# Patient Record
Sex: FEMALE | Race: ASIAN | NOT HISPANIC OR LATINO | URBAN - METROPOLITAN AREA
[De-identification: names, ages, dates, MRNs, and addresses within clinical notes are randomized per-mention and may not be internally consistent; named-entity substitution may affect disease eponyms.]

---

## 2023-06-08 ENCOUNTER — OUTPATIENT (OUTPATIENT)
Dept: OUTPATIENT SERVICES | Facility: HOSPITAL | Age: 68
LOS: 1 days | End: 2023-06-08
Payer: COMMERCIAL

## 2023-06-08 VITALS
TEMPERATURE: 97 F | RESPIRATION RATE: 16 BRPM | HEIGHT: 65 IN | WEIGHT: 154.1 LBS | OXYGEN SATURATION: 98 % | DIASTOLIC BLOOD PRESSURE: 88 MMHG | HEART RATE: 72 BPM | SYSTOLIC BLOOD PRESSURE: 146 MMHG

## 2023-06-08 DIAGNOSIS — E11.9 TYPE 2 DIABETES MELLITUS WITHOUT COMPLICATIONS: Chronic | ICD-10-CM

## 2023-06-08 DIAGNOSIS — Z98.890 OTHER SPECIFIED POSTPROCEDURAL STATES: Chronic | ICD-10-CM

## 2023-06-08 DIAGNOSIS — I10 ESSENTIAL (PRIMARY) HYPERTENSION: ICD-10-CM

## 2023-06-08 DIAGNOSIS — Z01.818 ENCOUNTER FOR OTHER PREPROCEDURAL EXAMINATION: ICD-10-CM

## 2023-06-08 DIAGNOSIS — I10 ESSENTIAL (PRIMARY) HYPERTENSION: Chronic | ICD-10-CM

## 2023-06-08 DIAGNOSIS — M17.11 UNILATERAL PRIMARY OSTEOARTHRITIS, RIGHT KNEE: ICD-10-CM

## 2023-06-08 DIAGNOSIS — E11.9 TYPE 2 DIABETES MELLITUS WITHOUT COMPLICATIONS: ICD-10-CM

## 2023-06-08 LAB
A1C WITH ESTIMATED AVERAGE GLUCOSE RESULT: 6.4 % — HIGH (ref 4–5.6)
BLD GP AB SCN SERPL QL: SIGNIFICANT CHANGE UP
ESTIMATED AVERAGE GLUCOSE: 137 MG/DL — HIGH (ref 68–114)

## 2023-06-08 RX ORDER — POVIDONE-IODINE 5 %
1 AEROSOL (ML) TOPICAL ONCE
Refills: 0 | Status: COMPLETED | OUTPATIENT
Start: 2023-06-09 | End: 2023-06-09

## 2023-06-08 NOTE — H&P PST ADULT - HISTORY OF PRESENT ILLNESS
67 y.o female with PMHx for HLD,  Asthma, DM, HTN, and Osteoporosis, B/l Carpal tunnel surgery [id number], Right Knee Arthroscopy 2020.  c/o of persistent right knee pain, on w/u found to have Ostearthritis of Right knee and now presents for presurgical evaluation for   schedule Right Total Knee Replacement on 6/9/23 with Dr Arce

## 2023-06-08 NOTE — H&P PST ADULT - NSANTHOSAYNRD_GEN_A_CORE
No. MARAL screening performed.  STOP BANG Legend: 0-2 = LOW Risk; 3-4 = INTERMEDIATE Risk; 5-8 = HIGH Risk

## 2023-06-08 NOTE — H&P PST ADULT - ATTENDING COMMENTS
Right knee OA  Plan for right knee TKA  Risks discussed: infection, hardware failure, blood clot, etc...  She signed the consent.

## 2023-06-08 NOTE — H&P PST ADULT - ASSESSMENT
Right Total Knee Replacement on 6/9/23 with Dr Crooks 67 y.o female with  Ostearthritis of Right knee and now for schedule Right Total Knee Replacement on 6/9/23 with Dr Yazmin YARBROUGH 3

## 2023-06-08 NOTE — H&P PST ADULT - NSICDXPASTMEDICALHX_GEN_ALL_CORE_FT
PAST MEDICAL HISTORY:  DM (diabetes mellitus)     HTN (hypertension)      PAST MEDICAL HISTORY:  Asthma     DM (diabetes mellitus)     HLD (hyperlipidemia)     HTN (hypertension)     OA (osteoarthritis)

## 2023-06-08 NOTE — CHART NOTE - NSCHARTNOTEFT_GEN_A_CORE
PRE-TJR SOCIAL/FUNCTIONAL SCREENING Via:     In Person Meet  on 6/8/2023:  Preferred Language: Mandarin ([phone])  Patient Telephone #: [phone]  EMAIL ADDRESS:  [email or web address]  Insurance: [employer]    Pt stated Goal for Surgery : No Pain, walk normally   SURGERY DETAILS:    Sx Date:  6/9/2023                                                                                           Surgery Type:  Right Knee Replacement  Surgeon:  Dr. Arce     SOCIAL HISTORY:   Live With: Alone in a Multifamily Home on the 2nd floor  Stairs Required to Access (Street to Front Door) Residence:  Yes; 7 into the house and 13 to the 2nd floor    Once Inside Pt Residence:   To Access a Bathroom:      No Stairs Required     To Access a Bedroom Where Pt. Can Sleep:  No Stairs Required     Pt. Currently Has Formal Home Health Services:   Yes ; [facility], [place]             Telephone Number: [phone]                                                                                                    HHA: Jeremy  [phone]  MOBILITY HISTORY:   Baseline Ambulation- Pt is Independent in ambulation with Device:    Yes;   Cane   Pt. Owns Any Other Medical Equipment?  No, will need rolling walker and 3-in-1 commode     MEDICAL HISTORY:  Pt has any History of Back Surgery:   No    Pt has any Allergies:  No    Patient denies diagnosis of sleep apnea or use of CPAP    Pt. Pharmacy Information:  Name:  [id number]                  Address:  [address] [place] [address]        Telephone #: [phone]    Pt. will Require Transportation to Home Upon Discharge:  Yes;  will be Made Aware:    For Post-Acute Care:  Pt. prefers [facility] for post-acute needs     Pt was provided with pre- op education in her language using translated power point and translated papes as well as .  Pt was provided with, and educated on, StretchStrap stretching & exercise device/strap. Pt. was provided with exercise program to facilitate range of motion and strength post-knee joint replacement. Pt. verbalized understanding.   All questions were answered, pt. has my phone number for follow up as needed.

## 2023-06-08 NOTE — H&P PST ADULT - PROBLEM SELECTOR PLAN 1
Pt schedule for Right Total Knee Replacement on 6/9/23 with Dr Arce.    Labs drawn in PCP = results in chart   Pt Medical clearance- in chart      Pt was  instructed to stop aspirin/ecotrin and all over the counter medication including vitamins and herbal supplements one week prior to surgery   Instructions given on the use of 4% chlorhexidine wash and Pt verbalized understanding of same   Pt Instructed to have nothing by mouth starting midnight day before surgery  Patient is to expect a phone call day before surgery between the hours of 430- 630pm giving arrival time for surgery   Written and verbal preoperative instructions given to patient with understanding verbalized.     Patient today with STOP bang score 3  Low  risk for MARAL Pt schedule for Right Total Knee Replacement on 6/9/23 with Dr Arce.    Labs and EKG done by PCP = results in chart   Pt Medical clearance- in chart    Pt Instructed on use of Mupirocin if nasal swab positive    Post surgery instruction given by Mack, booklets and pamphlets also given    Pt was  instructed to continue aspirin but stop all other over the counter medication including vitamins and herbal supplements one week prior to surgery   Instructions given on the use of 4% chlorhexidine wash and Pt verbalized understanding of same   Pt Instructed to have nothing by mouth starting midnight day before surgery  Patient is to expect a phone call day before surgery between the hours of 430- 630pm giving arrival time for surgery   Written and verbal preoperative instructions given to patient with understanding verbalized via  #[id number]    Patient today with STOP bang score 3  Low  risk for MARAL

## 2023-06-08 NOTE — H&P PST ADULT - MUSCULOSKELETAL
normal/ROM intact/normal gait/strength 5/5 bilateral upper extremities/strength 5/5 bilateral lower extremities details… uses can for ambulation due to right knee pain/ROM intact/strength 5/5 bilateral upper extremities

## 2023-06-09 ENCOUNTER — INPATIENT (INPATIENT)
Facility: HOSPITAL | Age: 68
LOS: 0 days | Discharge: HOME HEALTH | DRG: 470 | End: 2023-06-09
Attending: ORTHOPAEDIC SURGERY | Admitting: ORTHOPAEDIC SURGERY
Payer: COMMERCIAL

## 2023-06-09 VITALS — HEART RATE: 71 BPM | OXYGEN SATURATION: 98 % | DIASTOLIC BLOOD PRESSURE: 77 MMHG | SYSTOLIC BLOOD PRESSURE: 111 MMHG

## 2023-06-09 VITALS
OXYGEN SATURATION: 96 % | WEIGHT: 154.1 LBS | HEIGHT: 65 IN | TEMPERATURE: 98 F | HEART RATE: 70 BPM | DIASTOLIC BLOOD PRESSURE: 75 MMHG | RESPIRATION RATE: 16 BRPM | SYSTOLIC BLOOD PRESSURE: 125 MMHG

## 2023-06-09 DIAGNOSIS — Z98.890 OTHER SPECIFIED POSTPROCEDURAL STATES: Chronic | ICD-10-CM

## 2023-06-09 DIAGNOSIS — G89.18 OTHER ACUTE POSTPROCEDURAL PAIN: ICD-10-CM

## 2023-06-09 DIAGNOSIS — Z96.651 PRESENCE OF RIGHT ARTIFICIAL KNEE JOINT: ICD-10-CM

## 2023-06-09 DIAGNOSIS — M17.11 UNILATERAL PRIMARY OSTEOARTHRITIS, RIGHT KNEE: ICD-10-CM

## 2023-06-09 DIAGNOSIS — Z01.818 ENCOUNTER FOR OTHER PREPROCEDURAL EXAMINATION: ICD-10-CM

## 2023-06-09 LAB
ANION GAP SERPL CALC-SCNC: 5 MMOL/L — SIGNIFICANT CHANGE UP (ref 5–17)
BLD GP AB SCN SERPL QL: SIGNIFICANT CHANGE UP
BUN SERPL-MCNC: 14 MG/DL — SIGNIFICANT CHANGE UP (ref 7–18)
CALCIUM SERPL-MCNC: 8.4 MG/DL — SIGNIFICANT CHANGE UP (ref 8.4–10.5)
CHLORIDE SERPL-SCNC: 109 MMOL/L — HIGH (ref 96–108)
CO2 SERPL-SCNC: 26 MMOL/L — SIGNIFICANT CHANGE UP (ref 22–31)
CREAT SERPL-MCNC: 0.55 MG/DL — SIGNIFICANT CHANGE UP (ref 0.5–1.3)
EGFR: 100 ML/MIN/1.73M2 — SIGNIFICANT CHANGE UP
GLUCOSE BLDC GLUCOMTR-MCNC: 133 MG/DL — HIGH (ref 70–99)
GLUCOSE BLDC GLUCOMTR-MCNC: 150 MG/DL — HIGH (ref 70–99)
GLUCOSE BLDC GLUCOMTR-MCNC: 219 MG/DL — HIGH (ref 70–99)
GLUCOSE SERPL-MCNC: 166 MG/DL — HIGH (ref 70–99)
HCT VFR BLD CALC: 33.2 % — LOW (ref 34.5–45)
HGB BLD-MCNC: 11.1 G/DL — LOW (ref 11.5–15.5)
MCHC RBC-ENTMCNC: 31.9 PG — SIGNIFICANT CHANGE UP (ref 27–34)
MCHC RBC-ENTMCNC: 33.4 GM/DL — SIGNIFICANT CHANGE UP (ref 32–36)
MCV RBC AUTO: 95.4 FL — SIGNIFICANT CHANGE UP (ref 80–100)
MRSA PCR RESULT.: SIGNIFICANT CHANGE UP
NRBC # BLD: 0 /100 WBCS — SIGNIFICANT CHANGE UP (ref 0–0)
PLATELET # BLD AUTO: 201 K/UL — SIGNIFICANT CHANGE UP (ref 150–400)
POTASSIUM SERPL-MCNC: 3.4 MMOL/L — LOW (ref 3.5–5.3)
POTASSIUM SERPL-SCNC: 3.4 MMOL/L — LOW (ref 3.5–5.3)
RBC # BLD: 3.48 M/UL — LOW (ref 3.8–5.2)
RBC # FLD: 12.1 % — SIGNIFICANT CHANGE UP (ref 10.3–14.5)
S AUREUS DNA NOSE QL NAA+PROBE: SIGNIFICANT CHANGE UP
SODIUM SERPL-SCNC: 140 MMOL/L — SIGNIFICANT CHANGE UP (ref 135–145)
WBC # BLD: 7.33 K/UL — SIGNIFICANT CHANGE UP (ref 3.8–10.5)
WBC # FLD AUTO: 7.33 K/UL — SIGNIFICANT CHANGE UP (ref 3.8–10.5)

## 2023-06-09 PROCEDURE — G0463: CPT

## 2023-06-09 PROCEDURE — 36415 COLL VENOUS BLD VENIPUNCTURE: CPT

## 2023-06-09 PROCEDURE — 88311 DECALCIFY TISSUE: CPT | Mod: 26

## 2023-06-09 PROCEDURE — 88311 DECALCIFY TISSUE: CPT

## 2023-06-09 PROCEDURE — 88305 TISSUE EXAM BY PATHOLOGIST: CPT

## 2023-06-09 PROCEDURE — 73560 X-RAY EXAM OF KNEE 1 OR 2: CPT

## 2023-06-09 PROCEDURE — C1713: CPT

## 2023-06-09 PROCEDURE — 73560 X-RAY EXAM OF KNEE 1 OR 2: CPT | Mod: 26,RT

## 2023-06-09 PROCEDURE — 82962 GLUCOSE BLOOD TEST: CPT

## 2023-06-09 PROCEDURE — 80048 BASIC METABOLIC PNL TOTAL CA: CPT

## 2023-06-09 PROCEDURE — C1776: CPT

## 2023-06-09 PROCEDURE — 99222 1ST HOSP IP/OBS MODERATE 55: CPT

## 2023-06-09 PROCEDURE — 88305 TISSUE EXAM BY PATHOLOGIST: CPT | Mod: 26

## 2023-06-09 PROCEDURE — 86850 RBC ANTIBODY SCREEN: CPT

## 2023-06-09 PROCEDURE — 85027 COMPLETE CBC AUTOMATED: CPT

## 2023-06-09 PROCEDURE — 86901 BLOOD TYPING SEROLOGIC RH(D): CPT

## 2023-06-09 PROCEDURE — 86900 BLOOD TYPING SEROLOGIC ABO: CPT

## 2023-06-09 DEVICE — PIN STRL 1/8 X 3.5IN LONG: Type: IMPLANTABLE DEVICE | Site: RIGHT | Status: FUNCTIONAL

## 2023-06-09 DEVICE — CEMENT SIMPLEX P 40GM: Type: IMPLANTABLE DEVICE | Site: RIGHT | Status: FUNCTIONAL

## 2023-06-09 DEVICE — COMP FEM TRIATHLON CR SZ 3 RT: Type: IMPLANTABLE DEVICE | Site: RIGHT | Status: FUNCTIONAL

## 2023-06-09 DEVICE — CEMENT SIMPLEX WITH TOBRAMYCIN: Type: IMPLANTABLE DEVICE | Site: RIGHT | Status: FUNCTIONAL

## 2023-06-09 DEVICE — BASEPLATE TIB UNIV TRIATHLON SZ 3: Type: IMPLANTABLE DEVICE | Site: RIGHT | Status: FUNCTIONAL

## 2023-06-09 DEVICE — INSERT TIB BEARING CS X3 SZ 3 13MM: Type: IMPLANTABLE DEVICE | Site: RIGHT | Status: FUNCTIONAL

## 2023-06-09 DEVICE — IMP PATELLA ASYMMETRIC X3 29X9MM: Type: IMPLANTABLE DEVICE | Site: RIGHT | Status: FUNCTIONAL

## 2023-06-09 RX ORDER — FENTANYL CITRATE 50 UG/ML
50 INJECTION INTRAVENOUS
Refills: 0 | Status: DISCONTINUED | OUTPATIENT
Start: 2023-06-09 | End: 2023-06-09

## 2023-06-09 RX ORDER — METFORMIN HYDROCHLORIDE 850 MG/1
1 TABLET ORAL
Refills: 0 | DISCHARGE

## 2023-06-09 RX ORDER — TRAMADOL HYDROCHLORIDE 50 MG/1
50 TABLET ORAL EVERY 8 HOURS
Refills: 0 | Status: DISCONTINUED | OUTPATIENT
Start: 2023-06-09 | End: 2023-06-09

## 2023-06-09 RX ORDER — INSULIN LISPRO 100/ML
VIAL (ML) SUBCUTANEOUS
Refills: 0 | Status: DISCONTINUED | OUTPATIENT
Start: 2023-06-09 | End: 2023-06-09

## 2023-06-09 RX ORDER — ENOXAPARIN SODIUM 100 MG/ML
30 INJECTION SUBCUTANEOUS EVERY 12 HOURS
Refills: 0 | Status: DISCONTINUED | OUTPATIENT
Start: 2023-06-09 | End: 2023-06-09

## 2023-06-09 RX ORDER — LOSARTAN POTASSIUM 100 MG/1
1 TABLET, FILM COATED ORAL
Refills: 0 | DISCHARGE

## 2023-06-09 RX ORDER — ALENDRONATE SODIUM 70 MG/1
1 TABLET ORAL
Refills: 0 | DISCHARGE

## 2023-06-09 RX ORDER — SODIUM CHLORIDE 9 MG/ML
1000 INJECTION, SOLUTION INTRAVENOUS
Refills: 0 | Status: DISCONTINUED | OUTPATIENT
Start: 2023-06-09 | End: 2023-06-09

## 2023-06-09 RX ORDER — OXYBUTYNIN CHLORIDE 5 MG
5 TABLET ORAL DAILY
Refills: 0 | Status: DISCONTINUED | OUTPATIENT
Start: 2023-06-09 | End: 2023-06-09

## 2023-06-09 RX ORDER — SODIUM CHLORIDE 9 MG/ML
3 INJECTION INTRAMUSCULAR; INTRAVENOUS; SUBCUTANEOUS EVERY 8 HOURS
Refills: 0 | Status: DISCONTINUED | OUTPATIENT
Start: 2023-06-09 | End: 2023-06-09

## 2023-06-09 RX ORDER — DEXTROSE 50 % IN WATER 50 %
25 SYRINGE (ML) INTRAVENOUS ONCE
Refills: 0 | Status: DISCONTINUED | OUTPATIENT
Start: 2023-06-09 | End: 2023-06-09

## 2023-06-09 RX ORDER — CHLORHEXIDINE GLUCONATE 213 G/1000ML
1 SOLUTION TOPICAL ONCE
Refills: 0 | Status: COMPLETED | OUTPATIENT
Start: 2023-06-09 | End: 2023-06-09

## 2023-06-09 RX ORDER — SODIUM CHLORIDE 9 MG/ML
1000 INJECTION INTRAMUSCULAR; INTRAVENOUS; SUBCUTANEOUS
Refills: 0 | Status: DISCONTINUED | OUTPATIENT
Start: 2023-06-09 | End: 2023-06-09

## 2023-06-09 RX ORDER — TRAMADOL HYDROCHLORIDE 50 MG/1
1 TABLET ORAL
Qty: 6 | Refills: 0
Start: 2023-06-09 | End: 2023-06-11

## 2023-06-09 RX ORDER — CEFAZOLIN SODIUM 1 G
2000 VIAL (EA) INJECTION EVERY 8 HOURS
Refills: 0 | Status: COMPLETED | OUTPATIENT
Start: 2023-06-09 | End: 2023-06-10

## 2023-06-09 RX ORDER — CELECOXIB 200 MG/1
200 CAPSULE ORAL ONCE
Refills: 0 | Status: COMPLETED | OUTPATIENT
Start: 2023-06-09 | End: 2023-06-09

## 2023-06-09 RX ORDER — OXYBUTYNIN CHLORIDE 5 MG
1 TABLET ORAL
Refills: 0 | DISCHARGE

## 2023-06-09 RX ORDER — TRAMADOL HYDROCHLORIDE 50 MG/1
50 TABLET ORAL ONCE
Refills: 0 | Status: DISCONTINUED | OUTPATIENT
Start: 2023-06-09 | End: 2023-06-09

## 2023-06-09 RX ORDER — AMLODIPINE BESYLATE 2.5 MG/1
5 TABLET ORAL DAILY
Refills: 0 | Status: DISCONTINUED | OUTPATIENT
Start: 2023-06-09 | End: 2023-06-09

## 2023-06-09 RX ORDER — LOSARTAN POTASSIUM 100 MG/1
100 TABLET, FILM COATED ORAL DAILY
Refills: 0 | Status: DISCONTINUED | OUTPATIENT
Start: 2023-06-09 | End: 2023-06-09

## 2023-06-09 RX ORDER — PANTOPRAZOLE SODIUM 20 MG/1
40 TABLET, DELAYED RELEASE ORAL
Refills: 0 | Status: DISCONTINUED | OUTPATIENT
Start: 2023-06-09 | End: 2023-06-09

## 2023-06-09 RX ORDER — CALCIUM CARBONATE 500(1250)
0 TABLET ORAL
Refills: 0 | DISCHARGE

## 2023-06-09 RX ORDER — OXYCODONE HYDROCHLORIDE 5 MG/1
5 TABLET ORAL EVERY 4 HOURS
Refills: 0 | Status: DISCONTINUED | OUTPATIENT
Start: 2023-06-09 | End: 2023-06-09

## 2023-06-09 RX ORDER — CELECOXIB 200 MG/1
200 CAPSULE ORAL DAILY
Refills: 0 | Status: DISCONTINUED | OUTPATIENT
Start: 2023-06-10 | End: 2023-06-09

## 2023-06-09 RX ORDER — DEXTROSE 50 % IN WATER 50 %
12.5 SYRINGE (ML) INTRAVENOUS ONCE
Refills: 0 | Status: DISCONTINUED | OUTPATIENT
Start: 2023-06-09 | End: 2023-06-09

## 2023-06-09 RX ORDER — POTASSIUM CHLORIDE 20 MEQ
20 PACKET (EA) ORAL ONCE
Refills: 0 | Status: COMPLETED | OUTPATIENT
Start: 2023-06-09 | End: 2023-06-09

## 2023-06-09 RX ORDER — OMEPRAZOLE 10 MG/1
1 CAPSULE, DELAYED RELEASE ORAL
Refills: 0 | DISCHARGE

## 2023-06-09 RX ORDER — ICOSAPENT ETHYL 500 MG/1
2 CAPSULE, LIQUID FILLED ORAL
Refills: 0 | DISCHARGE

## 2023-06-09 RX ORDER — DEXTROSE 50 % IN WATER 50 %
15 SYRINGE (ML) INTRAVENOUS ONCE
Refills: 0 | Status: DISCONTINUED | OUTPATIENT
Start: 2023-06-09 | End: 2023-06-09

## 2023-06-09 RX ORDER — POLYETHYLENE GLYCOL 3350 17 G/17G
17 POWDER, FOR SOLUTION ORAL AT BEDTIME
Refills: 0 | Status: DISCONTINUED | OUTPATIENT
Start: 2023-06-09 | End: 2023-06-09

## 2023-06-09 RX ORDER — AMLODIPINE BESYLATE 2.5 MG/1
1 TABLET ORAL
Refills: 0 | DISCHARGE

## 2023-06-09 RX ORDER — SENNA PLUS 8.6 MG/1
2 TABLET ORAL AT BEDTIME
Refills: 0 | Status: DISCONTINUED | OUTPATIENT
Start: 2023-06-09 | End: 2023-06-09

## 2023-06-09 RX ORDER — MAGNESIUM HYDROXIDE 400 MG/1
30 TABLET, CHEWABLE ORAL DAILY
Refills: 0 | Status: DISCONTINUED | OUTPATIENT
Start: 2023-06-09 | End: 2023-06-09

## 2023-06-09 RX ORDER — ONDANSETRON 8 MG/1
4 TABLET, FILM COATED ORAL ONCE
Refills: 0 | Status: DISCONTINUED | OUTPATIENT
Start: 2023-06-09 | End: 2023-06-09

## 2023-06-09 RX ORDER — FENTANYL CITRATE 50 UG/ML
25 INJECTION INTRAVENOUS
Refills: 0 | Status: DISCONTINUED | OUTPATIENT
Start: 2023-06-09 | End: 2023-06-09

## 2023-06-09 RX ORDER — KETOROLAC TROMETHAMINE 30 MG/ML
15 SYRINGE (ML) INJECTION EVERY 6 HOURS
Refills: 0 | Status: DISCONTINUED | OUTPATIENT
Start: 2023-06-09 | End: 2023-06-09

## 2023-06-09 RX ORDER — ONDANSETRON 8 MG/1
4 TABLET, FILM COATED ORAL EVERY 6 HOURS
Refills: 0 | Status: DISCONTINUED | OUTPATIENT
Start: 2023-06-09 | End: 2023-06-09

## 2023-06-09 RX ORDER — IBUPROFEN 200 MG
1 TABLET ORAL
Qty: 42 | Refills: 0
Start: 2023-06-09 | End: 2023-06-22

## 2023-06-09 RX ORDER — ASPIRIN/CALCIUM CARB/MAGNESIUM 324 MG
1 TABLET ORAL
Qty: 21 | Refills: 0
Start: 2023-06-09 | End: 2023-06-29

## 2023-06-09 RX ORDER — CYCLOBENZAPRINE HYDROCHLORIDE 10 MG/1
1 TABLET, FILM COATED ORAL
Qty: 9 | Refills: 0
Start: 2023-06-09 | End: 2023-06-11

## 2023-06-09 RX ORDER — ACETAMINOPHEN 500 MG
1000 TABLET ORAL EVERY 6 HOURS
Refills: 0 | Status: DISCONTINUED | OUTPATIENT
Start: 2023-06-09 | End: 2023-06-09

## 2023-06-09 RX ORDER — ASPIRIN/CALCIUM CARB/MAGNESIUM 324 MG
1 TABLET ORAL
Refills: 0 | DISCHARGE

## 2023-06-09 RX ORDER — GLUCAGON INJECTION, SOLUTION 0.5 MG/.1ML
1 INJECTION, SOLUTION SUBCUTANEOUS ONCE
Refills: 0 | Status: DISCONTINUED | OUTPATIENT
Start: 2023-06-09 | End: 2023-06-09

## 2023-06-09 RX ADMIN — Medication 100 MILLIGRAM(S): at 16:03

## 2023-06-09 RX ADMIN — PANTOPRAZOLE SODIUM 40 MILLIGRAM(S): 20 TABLET, DELAYED RELEASE ORAL at 16:04

## 2023-06-09 RX ADMIN — Medication 1000 MILLIGRAM(S): at 18:56

## 2023-06-09 RX ADMIN — Medication 20 MILLIEQUIVALENT(S): at 16:06

## 2023-06-09 RX ADMIN — CELECOXIB 200 MILLIGRAM(S): 200 CAPSULE ORAL at 06:54

## 2023-06-09 RX ADMIN — Medication 1 TABLET(S): at 14:17

## 2023-06-09 RX ADMIN — TRAMADOL HYDROCHLORIDE 50 MILLIGRAM(S): 50 TABLET ORAL at 14:14

## 2023-06-09 RX ADMIN — ENOXAPARIN SODIUM 30 MILLIGRAM(S): 100 INJECTION SUBCUTANEOUS at 18:56

## 2023-06-09 RX ADMIN — SODIUM CHLORIDE 110 MILLILITER(S): 9 INJECTION INTRAMUSCULAR; INTRAVENOUS; SUBCUTANEOUS at 16:03

## 2023-06-09 RX ADMIN — Medication 5 MILLIGRAM(S): at 14:15

## 2023-06-09 RX ADMIN — Medication 4: at 16:50

## 2023-06-09 RX ADMIN — Medication 1 APPLICATION(S): at 06:55

## 2023-06-09 RX ADMIN — CHLORHEXIDINE GLUCONATE 1 APPLICATION(S): 213 SOLUTION TOPICAL at 06:41

## 2023-06-09 RX ADMIN — TRAMADOL HYDROCHLORIDE 50 MILLIGRAM(S): 50 TABLET ORAL at 06:54

## 2023-06-09 RX ADMIN — TRAMADOL HYDROCHLORIDE 50 MILLIGRAM(S): 50 TABLET ORAL at 15:14

## 2023-06-09 NOTE — DISCHARGE NOTE NURSING/CASE MANAGEMENT/SOCIAL WORK - NSDCPEFALRISK_GEN_ALL_CORE
For information on Fall & Injury Prevention, visit: [email or web address] OR  [email or web address] OR  https://www.cdc.gov/steadi/patient.html

## 2023-06-09 NOTE — DISCHARGE NOTE PROVIDER - NSDCCPTREATMENT_GEN_ALL_CORE_FT
PRINCIPAL PROCEDURE  Procedure: Right total knee replacement  Findings and Treatment: S/p Right total knee replacement on 6/9/23

## 2023-06-09 NOTE — CONSULT NOTE ADULT - ASSESSMENT
Confidential Drug Utilization Report  Search Terms: Demian Gay, 1955Search Date: 06/09/2023 14:04:32 PM  The Drug Utilization Report below displays all of the controlled substance prescriptions, if any, that your patient has filled in the last twelve months. The information displayed on this report is compiled from pharmacy submissions to the Department, and accurately reflects the information as submitted by the pharmacies.    This report was requested by: Chacha Arguello | Reference #: [id number]    You have not added a BETHANY number. Keeping your BETHANY number(s) up to date on the My BETHANY # tab will enable the separation of your prescriptions from others in the search results.    Practitioner Count: 1  Pharmacy Count: 1  Current Opioid Prescriptions: 0  Current Benzodiazepine Prescriptions: 0  Current Stimulant Prescriptions: 0      Patient Demographic Information (PDI)       PDI	First Name	Last Name	Birth Date	Gender	Street Address	[facility]	Zip Code  A	Tessy Gay	1955	Female	[phone] [facility]	[address]    Prescription Information      PDI Filter:    PDI	Current Rx	Drug Type	Rx Written	Rx Dispensed	Drug	Quantity	Days Supply  A	Y		05/08/2023	05/11/2023	zolpidem tartrate 10 mg tablet	30	30  Prescriber Name Cristina Xiong  Prescriber BETHANY # [id number]  Payment Method Insurance  [facility]  A	N		04/07/2023	04/07/2023	zolpidem tartrate 10 mg tablet	30	30  Prescriber Name Cristina Xiong  Prescriber BETHANY # [id number]  Payment Method Insurance  [facility]  A	N		03/06/2023	03/06/2023	zolpidem tartrate 10 mg tablet	30	30  Prescriber Name Cristina Xiong  Prescriber BETHANY # [id number]  Payment Method Insurance  [facility]  A	N		12/06/2022	12/06/2022	zolpidem tartrate 10 mg tablet	30	30  Prescriber Name Cristina Xiong  Prescriber BETHANY # [id number]  Payment Method Insurance  [facility]  A	N		11/03/2022	11/10/2022	zolpidem tartrate 10 mg tablet	30	30  Prescriber Name Cristina Xiong  Prescriber BETHANY # [id number]  Payment Method Insurance  [facility]  A	N		10/05/2022	10/10/2022	zolpidem tartrate 10 mg tablet	30	30  Prescriber Name Cristina Xiong  Prescriber BETHANY # [id number]  Payment Method Insurance  [facility]  A	N		08/31/2022	09/09/2022	zolpidem tartrate 10 mg tablet	30	30  Prescriber Name Cristina Xiong  Prescriber BETHANY # [id number]  Payment Method Insurance  [facility]  A	N		08/01/2022	08/10/2022	zolpidem tartrate 10 mg tablet	30	30  Prescriber Name Cristina Xiong  Prescriber BETHANY # [id number]  Payment Method Insurance  [facility]  A	N		07/08/2022	07/09/2022	zolpidem tartrate 10 mg tablet	30	30  Prescriber Name Cristina Xiong  Prescriber BETHANY # [id number]  Payment Method Insurance  [facility]  A	N		05/16/2022	06/11/2022	zolpidem tartrate 10 mg tablet	30	30  Prescriber Name Cristina Xiong  Prescriber BETHANY # [id number]  Payment Method Insurance  [facility]    * - Details of Drug Type : O = Opioid, B = Benzodiazepine, S = Stimulant    * - Drugs marked with an asterisk are compound drugs. If the compound drug is made up of more than one controlled substance, then each controlled substance will be a separate row in the table.

## 2023-06-09 NOTE — PHYSICAL THERAPY INITIAL EVALUATION ADULT - PATIENT/FAMILY/SIGNIFICANT OTHER GOALS STATEMENT, PT EVAL
Patient Mandarin speaking and  services used with Id# [id number]. Patient stated that she has pain on ambulation(5/10)

## 2023-06-09 NOTE — PHYSICAL THERAPY INITIAL EVALUATION ADULT - DIAGNOSIS, PT EVAL
Patient presented with pain on R knee, AROM R knee to 75 deg., impairments in balance during ambulation and stair negotiation

## 2023-06-09 NOTE — CHART NOTE - NSCHARTNOTEFT_GEN_A_CORE
68 y/o Female with PMHx of HLD,  Asthma, DM, HTN, and Osteoporosis, B/l Carpal tunnel surgery [id number], Right Knee Arthroscopy 2020. She is s/p Right Total Knee replacement. Patient was seen and evaluated at bedside today. Pain is localized to the RLE and well controlled with medication. At home patient is confined to a room that does not have a bathroom, thus patient will need commode.

## 2023-06-09 NOTE — CONSULT NOTE ADULT - PROBLEM SELECTOR RECOMMENDATION 9
Pt with acute post-operative pain of right knee which is somatic and neuropathic in nature due to surgery. Pt is s/p  Right total knee replacement on 6/9. POD#0. High risk medications reviewed. Avoid polypharmacy. Avoid IV opioids. Non-pharmacological sleep aides initiated. Non-opioid medications and non-pharmacological pain management measures initiated.  Opioid pain recommendations   - Continue Tramadol 50mg PO q 8 hours. Monitor for sedation/ respiratory depression.   - Continue Oxycodone 5 mg PO q 4 hours PRN moderate pain. Monitor for sedation/ respiratory depression.   Non-opioid pain recommendations   - Continue Toradol 15 mg IVP q 6 hours PRN severe pain  - Continue Acetaminophen 1 gram PO q 6 hours for 24 hours only. Then q8h x 3 days. Monitor LFTs  - Continue Celebrex 200mg PO daily  Bowel Regimen  - Continue Miralax 17G PO daily  - Continue Senna 2 tablets at bedtime for constipation  Mild pain   - Non-pharmacological pain treatment recommendations  - Warm/ Cool packs PRN   - Repositioning extremity, elevation, imagery, relaxation, distraction.  - Physical therapy OOB if no contraindications   Recommendations discussed with primary team and RN.  Upon discharge – dc on Celebrex 200mg po daily, and Percocet 5/325mg po q 6 hours prn for 1 week. Pt to take OTC stool softeners

## 2023-06-09 NOTE — CONSULT NOTE ADULT - SUBJECTIVE AND OBJECTIVE BOX
Source of information: YU ARREOLA, Chart review  Patient language: English  : n/a    HPI:      Patient is a 67y old  Female who presents with a chief complaint of S/p Right total knee replacement (09 Jun 2023 09:15)  . Pt is admitted for ..., being treated with .... Pain consulted for .... Pt seen and examined at bedside. Reports pain score ***  SCALE USED: (1-10 VNRS). Pt describes pain as .... radiating to... alleviated by pain medication... exacerbated by movement... Pt tolerating PO diet. Denies lethargy, nausea, vomiting, constipation, itchiness. Reports last BM ***. Patient stated goal for pain control: to be able to take deep breaths, get out of bed to chair and ambulate with tolerable pain control. Pt denies taking medications for pain at home.     PAST MEDICAL & SURGICAL HISTORY:  DM (diabetes mellitus)      HTN (hypertension)      OA (osteoarthritis)      Asthma      HLD (hyperlipidemia)      H/O arthroscopy of knee      H/O carpal tunnel repair          FAMILY HISTORY:      Social History:   [ ] Denies ETOH use, illicit drug use and smoking    Allergies    No Known Allergies    Intolerances        MEDICATIONS  (STANDING):  acetaminophen     Tablet .. 1000 milliGRAM(s) Oral every 6 hours  amLODIPine   Tablet 5 milliGRAM(s) Oral daily  ceFAZolin   IVPB 2000 milliGRAM(s) IV Intermittent every 8 hours  dextrose 5%. 1000 milliLiter(s) (50 mL/Hr) IV Continuous <Continuous>  dextrose 5%. 1000 milliLiter(s) (100 mL/Hr) IV Continuous <Continuous>  dextrose 50% Injectable 12.5 Gram(s) IV Push once  dextrose 50% Injectable 25 Gram(s) IV Push once  dextrose 50% Injectable 25 Gram(s) IV Push once  enoxaparin Injectable 30 milliGRAM(s) SubCutaneous every 12 hours  glucagon  Injectable 1 milliGRAM(s) IntraMuscular once  insulin lispro (ADMELOG) corrective regimen sliding scale   SubCutaneous three times a day before meals  losartan 100 milliGRAM(s) Oral daily  multivitamin 1 Tablet(s) Oral daily  oxybutynin 5 milliGRAM(s) Oral daily  pantoprazole    Tablet 40 milliGRAM(s) Oral before breakfast  polyethylene glycol 3350 17 Gram(s) Oral at bedtime  senna 2 Tablet(s) Oral at bedtime  sodium chloride 0.9%. 1000 milliLiter(s) (110 mL/Hr) IV Continuous <Continuous>  traMADol 50 milliGRAM(s) Oral every 8 hours    MEDICATIONS  (PRN):  dextrose Oral Gel 15 Gram(s) Oral once PRN Blood Glucose LESS THAN 70 milliGRAM(s)/deciliter  ketorolac   Injectable 15 milliGRAM(s) IV Push every 6 hours PRN Severe Pain (7 - 10)  magnesium hydroxide Suspension 30 milliLiter(s) Oral daily PRN Constipation  ondansetron Injectable 4 milliGRAM(s) IV Push every 6 hours PRN Nausea and/or Vomiting  oxyCODONE    IR 5 milliGRAM(s) Oral every 4 hours PRN Moderate Pain (4 - 6)      Vital Signs Last 24 Hrs  T(C): 36.3 (09 Jun 2023 12:20), Max: 36.6 (09 Jun 2023 10:15)  T(F): 97.3 (09 Jun 2023 12:20), Max: 97.9 (09 Jun 2023 10:15)  HR: 67 (09 Jun 2023 13:09) (56 - 72)  BP: 118/75 (09 Jun 2023 13:09) (90/54 - 125/75)  BP(mean): 80 (09 Jun 2023 11:36) (60 - 80)  RR: 17 (09 Jun 2023 12:20) (12 - 17)  SpO2: 98% (09 Jun 2023 13:09) (92% - 99%)    Parameters below as of 09 Jun 2023 13:09  Patient On (Oxygen Delivery Method): room air        LABS: Reviewed.                          11.1   7.33  )-----------( 201      ( 09 Jun 2023 10:38 )             33.2     06-09    140  |  109<H>  |  14  ----------------------------<  166<H>  3.4<L>   |  26  |  0.55    Ca    8.4      09 Jun 2023 10:38            CAPILLARY BLOOD GLUCOSE      POCT Blood Glucose.: 150 mg/dL (09 Jun 2023 10:39)  POCT Blood Glucose.: 133 mg/dL (09 Jun 2023 06:42)        Radiology: Reviewed.     ORT Score -   Family Hx of substance abuse	Female	      Male  Alcohol 	                                           1                     3  Illegal drugs	                                   2                     3  Rx drugs                                           4 	                  4  Personal Hx of substance abuse		  Alcohol 	                                          3	                  3  Illegal drugs                                     4	                  4  Rx drugs                                            5 	                  5  Age between 16- 45 years	           1                     1  hx preadolescent sexual abuse	   3 	                  0  Psychological disease		  ADD, OCD, bipolar, schizophrenia   2	          2  Depression                                           1 	          1  Total: 0    a score of 3 or lower indicates low risk for opioid abuse		  a score of 4-7 indicates moderate risk for opioid abuse		  a score of 8 or higher indicates high risk for opioid abuse  	  4AT (Assessment test for delirium & cognitive impairment)  _________________________________________________________  [1] ALERTNESS  This includes patients who may be markedly drowsy (eg. difficult to rouse and/or obviously sleepy  during assessment) or agitated/hyperactive. Observe the patient. If asleep, attempt to wake with  speech or gentle touch on shoulder. Ask the patient to state their name and address to assist rating.  Normal (fully alert, but not agitated, throughout assessment) 0  Mild sleepiness for <10 seconds after waking, then normal 0  Clearly abnormal 4    [2] AMT4  Age, date of birth, place (name of the hospital or building), current year.  No mistakes 0  1 mistake 1  2 or more mistakes/untestable 2    [3] ATTENTION  Ask the patient: “Please tell me the months of the year in backwards order, starting at December.”  To assist initial understanding one prompt of “what is the month before December?” is permitted.  Months of the year backwards Achieves 7 months or more correctly 0  Starts but scores <7 months / refuses to start 1   Untestable (cannot start because unwell, drowsy, inattentive) 2    [4] ACUTE CHANGE OR FLUCTUATING COURSE  Evidence of significant change or fluctuation in: alertness, cognition, other mental function  (eg. paranoia, hallucinations) arising over the last 2 weeks and still evident in last 24hrs  No 0  Yes 4    4 or above: possible delirium +/- cognitive impairment  1-3: possible cognitive impairment  0: delirium or severe cognitive impairment unlikely (but delirium still possible if [4] information incomplete)    4AT SCORE: 0    REVIEW OF SYSTEMS:  CONSTITUTIONAL: No fever or fatigue  HEENT:  No difficulty hearing, no change in vision  NECK: No pain or stiffness  RESPIRATORY: No cough, wheezing, chills or hemoptysis; No shortness of breath  CARDIOVASCULAR: No chest pain, palpitations, dizziness, or leg swelling  GASTROINTESTINAL: No loss of appetite, decreased PO intake. No abdominal or epigastric pain. No nausea, vomiting; No diarrhea or constipation.   GENITOURINARY: No dysuria, frequency, hematuria, retention or incontinence  MUSCULOSKELETAL: No joint pain or swelling; No muscle, back, or extremity pain, no upper or lower motor strength weakness, no saddle anesthesia, bowel/bladder incontinence, no falls   NEURO: No headaches, No numbness/tingling b/l LE, No weakness  ENDOCRINE: No polyuria, polydipsia, heat or cold intolerance; No hair loss  PSYCHIATRIC: No depression, anxiety or difficulty sleeping    PHYSICAL EXAM:  GENERAL:  Alert & Oriented X4, cooperative, NAD, Good concentration. Speech is clear.   RESPIRATORY: Respirations even and unlabored. Clear to auscultation bilaterally; No rales, rhonchi, wheezing, or rubs  CARDIOVASCULAR: Normal S1/S2, regular rate and rhythm; No murmurs, rubs, or gallops. No JVD.   GASTROINTESTINAL:  Soft, Nontender, Nondistended; Bowel sounds present  PERIPHERAL VASCULAR:  Extremities warm without edema. 2+ Peripheral Pulses, No cyanosis, No calf tenderness  MUSCULOSKELETAL: Motor Strength 5/5 B/L upper and lower extremities; moves all extremities equally against gravity; ROM intact; negative SLR; No tenderness on palpation of all joints.   SKIN: Warm, dry, intact. No rashes, lesions, scars or wounds.     Risk factors associated with adverse outcomes related to opioid treatment  [ ]  Concurrent benzodiazepine use  [ ]  History/ Active substance use or alcohol use disorder  [ ] Psychiatric co-morbidity  [ ] Sleep apnea  [ ] COPD  [ ] BMI> 35  [ ] Liver dysfunction  [ ] Renal dysfunction  [ ] CHF  [ ] Smoker  [ ]  Age > 60 years    [ ]  [place]  Reviewed and Copied to Chart. See below.    Plan of care and goal oriented pain management treatment options were discussed with patient and /or primary care giver; all questions and concerns were addressed and care was aligned with patient's wishes.    Educated patient on goal oriented pain management treatment options        Source of information: YU ARREOLA, Chart review  Patient language: Mandarin  : yes, #ID [id number] name:Daniel    HPI:    Pt is admitted for Right knee surgery. Hx of OA Right knee. Pain consulted for Right knee post-operative acute pain. Pt is s/p Right total knee replacement on 6/9. POD#0. Pt seen and examined at bedside. Pt is awake, alert and oriented x 3, speech clear. Pt is Mandarin speaking.  services used ID#[id number]. Pt found OOB to chair, tolerating well. Reports pain score 3/10 and tolerable.  SCALE USED: (1-10 VNRS). Pt describes pain as aching, dull pain, non- radiating and alleviated by pain medication, and exacerbated by movement and ambulation. Pt tolerating PO diet. Denies lethargy, nausea, vomiting, constipation, itchiness. Reports last BM today 6/9. Patient stated goal for pain control: to be able to take deep breaths, get out of bed to chair and ambulate with tolerable pain control. Pt denies taking medications for pain at home. Pt reports ambulated with walker with PT, with tolerable pain control.    PAST MEDICAL & SURGICAL HISTORY:  DM (diabetes mellitus)    HTN (hypertension)    OA (osteoarthritis)    Asthma    HLD (hyperlipidemia)    H/O arthroscopy of knee    H/O carpal tunnel repair    FAMILY HISTORY:    Social History:   [x] Denies ETOH use, illicit drug use and smoking    Allergies    No Known Allergies    Intolerances    MEDICATIONS  (STANDING):  acetaminophen     Tablet .. 1000 milliGRAM(s) Oral every 6 hours  amLODIPine   Tablet 5 milliGRAM(s) Oral daily  ceFAZolin   IVPB 2000 milliGRAM(s) IV Intermittent every 8 hours  dextrose 5%. 1000 milliLiter(s) (50 mL/Hr) IV Continuous <Continuous>  dextrose 5%. 1000 milliLiter(s) (100 mL/Hr) IV Continuous <Continuous>  dextrose 50% Injectable 12.5 Gram(s) IV Push once  dextrose 50% Injectable 25 Gram(s) IV Push once  dextrose 50% Injectable 25 Gram(s) IV Push once  enoxaparin Injectable 30 milliGRAM(s) SubCutaneous every 12 hours  glucagon  Injectable 1 milliGRAM(s) IntraMuscular once  insulin lispro (ADMELOG) corrective regimen sliding scale   SubCutaneous three times a day before meals  losartan 100 milliGRAM(s) Oral daily  multivitamin 1 Tablet(s) Oral daily  oxybutynin 5 milliGRAM(s) Oral daily  pantoprazole    Tablet 40 milliGRAM(s) Oral before breakfast  polyethylene glycol 3350 17 Gram(s) Oral at bedtime  senna 2 Tablet(s) Oral at bedtime  sodium chloride 0.9%. 1000 milliLiter(s) (110 mL/Hr) IV Continuous <Continuous>  traMADol 50 milliGRAM(s) Oral every 8 hours    MEDICATIONS  (PRN):  dextrose Oral Gel 15 Gram(s) Oral once PRN Blood Glucose LESS THAN 70 milliGRAM(s)/deciliter  ketorolac   Injectable 15 milliGRAM(s) IV Push every 6 hours PRN Severe Pain (7 - 10)  magnesium hydroxide Suspension 30 milliLiter(s) Oral daily PRN Constipation  ondansetron Injectable 4 milliGRAM(s) IV Push every 6 hours PRN Nausea and/or Vomiting  oxyCODONE    IR 5 milliGRAM(s) Oral every 4 hours PRN Moderate Pain (4 - 6)    Vital Signs Last 24 Hrs  T(C): 36.3 (09 Jun 2023 12:20), Max: 36.6 (09 Jun 2023 10:15)  T(F): 97.3 (09 Jun 2023 12:20), Max: 97.9 (09 Jun 2023 10:15)  HR: 67 (09 Jun 2023 13:09) (56 - 72)  BP: 118/75 (09 Jun 2023 13:09) (90/54 - 125/75)  BP(mean): 80 (09 Jun 2023 11:36) (60 - 80)  RR: 17 (09 Jun 2023 12:20) (12 - 17)  SpO2: 98% (09 Jun 2023 13:09) (92% - 99%)    Parameters below as of 09 Jun 2023 13:09  Patient On (Oxygen Delivery Method): room air    LABS: Reviewed.                        11.1   7.33  )-----------( 201      ( 09 Jun 2023 10:38 )             33.2     06-09    140  |  109<H>  |  14  ----------------------------<  166<H>  3.4<L>   |  26  |  0.55    Ca    8.4      09 Jun 2023 10:38    CAPILLARY BLOOD GLUCOSE    POCT Blood Glucose.: 150 mg/dL (09 Jun 2023 10:39)  POCT Blood Glucose.: 133 mg/dL (09 Jun 2023 06:42)    Radiology: Reviewed.   ACC: [id number] EXAM:  XR KNEE 1-2 VIEWS RT   ORDERED BY: CHANTEL NORTON     PROCEDURE DATE:  06/09/2023      INTERPRETATION:  CLINICAL STATEMENT: Post Op.    TECHNIQUE: AP, lateral views of  right knee.    COMPARISON: None.    FINDINGS:  Total knee replacement noted in gross anatomic alignment with post   operative changes.    IMPRESSION:  Post operative changes.    --- End of Report ---    IRMA ROSENTHAL MD; Attending Radiologist  This document has been electronically signed. Jun 9 2023  1:40PM    ORT Score -   Family Hx of substance abuse	Female	      Male  Alcohol 	                                           1                     3  Illegal drugs	                                   2                     3  Rx drugs                                           4 	                  4  Personal Hx of substance abuse		  Alcohol 	                                          3	                  3  Illegal drugs                                     4	                  4  Rx drugs                                            5 	                  5  Age between 16- 45 years	           1                     1  hx preadolescent sexual abuse	   3 	                  0  Psychological disease		  ADD, OCD, bipolar, schizophrenia   2	          2  Depression                                           1 	          1  Total: 0    REVIEW OF SYSTEMS:  CONSTITUTIONAL: No fever or fatigue  HEENT:  No difficulty hearing, no change in vision  NECK: No pain or stiffness  RESPIRATORY: No cough, wheezing, chills or hemoptysis; No shortness of breath  CARDIOVASCULAR: No chest pain, palpitations, dizziness, or leg swelling  GASTROINTESTINAL: No loss of appetite, decreased PO intake. No abdominal or epigastric pain. No nausea, vomiting; No diarrhea or constipation.   GENITOURINARY: No dysuria, frequency, hematuria, retention or incontinence  MUSCULOSKELETAL: +right knee pain, No muscle, back, or extremity pain, no upper or lower motor strength weakness, no saddle anesthesia, bowel/bladder incontinence, no falls   NEURO: No headaches, No numbness/tingling b/l LE, No weakness  ENDOCRINE: No polyuria, polydipsia, heat or cold intolerance; No hair loss  PSYCHIATRIC: No depression, anxiety, +difficulty sleeping    PHYSICAL EXAM:  GENERAL:  Alert & Oriented X4, cooperative, NAD, Good concentration. Speech is clear. Mandarin speaking  RESPIRATORY: Respirations even and unlabored. Clear to auscultation bilaterally; No rales, rhonchi, wheezing, or rubs  CARDIOVASCULAR: Normal S1/S2, regular rate and rhythm; No murmurs, rubs, or gallops. No JVD.   GASTROINTESTINAL:  Soft, Nontender, Nondistended; Bowel sounds present  PERIPHERAL VASCULAR:  Extremities warm without edema. 2+ Peripheral Pulses, No cyanosis, No calf tenderness  MUSCULOSKELETAL: Motor Strength 5/5 B/L upper and lower extremities; moves all extremities equally against gravity; ROM decreased RLE; + tenderness on palpation of Right knee pain  SKIN: Warm, dry, intact. No rashes, lesions, or scars. Right knee with ace wrap dressing c/d/i    Risk factors associated with adverse outcomes related to opioid treatment  [ ]  Concurrent benzodiazepine use  [ ]  History/ Active substance use or alcohol use disorder  [ ] Psychiatric co-morbidity  [ ] Sleep apnea  [ ] COPD  [ ] BMI> 35  [ ] Liver dysfunction  [ ] Renal dysfunction  [ ] CHF  [ ] Smoker  [x]  Age > 60 years    [x]  [place]  Reviewed and Copied to Chart. See below.    Plan of care and goal oriented pain management treatment options were discussed with patient and /or primary care giver; all questions and concerns were addressed and care was aligned with patient's wishes.    Educated patient on goal oriented pain management treatment options        Source of information: YU ARREOLA, Chart review  Patient language: Mandarin  : yes, #ID [id number] name:Daniel    HPI:    Pt is admitted for Right knee surgery. Hx of OA Right knee. Pain consulted for Right knee post-operative acute pain. Pt is s/p Right total knee replacement on 6/9. POD#0. Pt seen and examined at bedside. Pt is awake, alert and oriented x 3, speech clear. Pt is Mandarin speaking.  services used ID#[id number]. Pt found OOB to chair, tolerating well. Reports pain score 3/10 and tolerable.  SCALE USED: (1-10 VNRS). Pt describes pain as aching, dull pain, non- radiating and alleviated by pain medication, and exacerbated by movement and ambulation. Pt tolerating PO diet. Denies lethargy, nausea, vomiting, constipation, itchiness. Reports last BM today 6/9. Patient stated goal for pain control: to be able to take deep breaths, get out of bed to chair and ambulate with tolerable pain control. Pt denies taking medications for pain at home. Pt reports ambulated with walker with PT, with tolerable pain control. Pt stating walking with cane baseline.     PAST MEDICAL & SURGICAL HISTORY:  DM (diabetes mellitus)    HTN (hypertension)    OA (osteoarthritis)    Asthma    HLD (hyperlipidemia)    H/O arthroscopy of knee    H/O carpal tunnel repair    FAMILY HISTORY:    Social History:   [x] Denies ETOH use, illicit drug use and smoking    Allergies    No Known Allergies    Intolerances    MEDICATIONS  (STANDING):  acetaminophen     Tablet .. 1000 milliGRAM(s) Oral every 6 hours  amLODIPine   Tablet 5 milliGRAM(s) Oral daily  ceFAZolin   IVPB 2000 milliGRAM(s) IV Intermittent every 8 hours  dextrose 5%. 1000 milliLiter(s) (50 mL/Hr) IV Continuous <Continuous>  dextrose 5%. 1000 milliLiter(s) (100 mL/Hr) IV Continuous <Continuous>  dextrose 50% Injectable 12.5 Gram(s) IV Push once  dextrose 50% Injectable 25 Gram(s) IV Push once  dextrose 50% Injectable 25 Gram(s) IV Push once  enoxaparin Injectable 30 milliGRAM(s) SubCutaneous every 12 hours  glucagon  Injectable 1 milliGRAM(s) IntraMuscular once  insulin lispro (ADMELOG) corrective regimen sliding scale   SubCutaneous three times a day before meals  losartan 100 milliGRAM(s) Oral daily  multivitamin 1 Tablet(s) Oral daily  oxybutynin 5 milliGRAM(s) Oral daily  pantoprazole    Tablet 40 milliGRAM(s) Oral before breakfast  polyethylene glycol 3350 17 Gram(s) Oral at bedtime  senna 2 Tablet(s) Oral at bedtime  sodium chloride 0.9%. 1000 milliLiter(s) (110 mL/Hr) IV Continuous <Continuous>  traMADol 50 milliGRAM(s) Oral every 8 hours    MEDICATIONS  (PRN):  dextrose Oral Gel 15 Gram(s) Oral once PRN Blood Glucose LESS THAN 70 milliGRAM(s)/deciliter  ketorolac   Injectable 15 milliGRAM(s) IV Push every 6 hours PRN Severe Pain (7 - 10)  magnesium hydroxide Suspension 30 milliLiter(s) Oral daily PRN Constipation  ondansetron Injectable 4 milliGRAM(s) IV Push every 6 hours PRN Nausea and/or Vomiting  oxyCODONE    IR 5 milliGRAM(s) Oral every 4 hours PRN Moderate Pain (4 - 6)    Vital Signs Last 24 Hrs  T(C): 36.3 (09 Jun 2023 12:20), Max: 36.6 (09 Jun 2023 10:15)  T(F): 97.3 (09 Jun 2023 12:20), Max: 97.9 (09 Jun 2023 10:15)  HR: 67 (09 Jun 2023 13:09) (56 - 72)  BP: 118/75 (09 Jun 2023 13:09) (90/54 - 125/75)  BP(mean): 80 (09 Jun 2023 11:36) (60 - 80)  RR: 17 (09 Jun 2023 12:20) (12 - 17)  SpO2: 98% (09 Jun 2023 13:09) (92% - 99%)    Parameters below as of 09 Jun 2023 13:09  Patient On (Oxygen Delivery Method): room air    LABS: Reviewed.                        11.1   7.33  )-----------( 201      ( 09 Jun 2023 10:38 )             33.2     06-09    140  |  109<H>  |  14  ----------------------------<  166<H>  3.4<L>   |  26  |  0.55    Ca    8.4      09 Jun 2023 10:38    CAPILLARY BLOOD GLUCOSE    POCT Blood Glucose.: 150 mg/dL (09 Jun 2023 10:39)  POCT Blood Glucose.: 133 mg/dL (09 Jun 2023 06:42)    Radiology: Reviewed.   ACC: [id number] EXAM:  XR KNEE 1-2 VIEWS RT   ORDERED BY: CHANTEL NORTON     PROCEDURE DATE:  06/09/2023      INTERPRETATION:  CLINICAL STATEMENT: Post Op.    TECHNIQUE: AP, lateral views of  right knee.    COMPARISON: None.    FINDINGS:  Total knee replacement noted in gross anatomic alignment with post   operative changes.    IMPRESSION:  Post operative changes.    --- End of Report ---    IRMA ROSENTHAL MD; Attending Radiologist  This document has been electronically signed. Jun 9 2023  1:40PM    ORT Score -   Family Hx of substance abuse	Female	      Male  Alcohol 	                                           1                     3  Illegal drugs	                                   2                     3  Rx drugs                                           4 	                  4  Personal Hx of substance abuse		  Alcohol 	                                          3	                  3  Illegal drugs                                     4	                  4  Rx drugs                                            5 	                  5  Age between 16- 45 years	           1                     1  hx preadolescent sexual abuse	   3 	                  0  Psychological disease		  ADD, OCD, bipolar, schizophrenia   2	          2  Depression                                           1 	          1  Total: 0    REVIEW OF SYSTEMS:  CONSTITUTIONAL: No fever or fatigue  HEENT:  No difficulty hearing, no change in vision  NECK: No pain or stiffness  RESPIRATORY: No cough, wheezing, chills or hemoptysis; No shortness of breath  CARDIOVASCULAR: No chest pain, palpitations, dizziness, or leg swelling  GASTROINTESTINAL: No loss of appetite, decreased PO intake. No abdominal or epigastric pain. No nausea, vomiting; No diarrhea or constipation.   GENITOURINARY: No dysuria, frequency, hematuria, retention or incontinence  MUSCULOSKELETAL: +right knee pain, No muscle, back, or extremity pain, no upper or lower motor strength weakness, no saddle anesthesia, bowel/bladder incontinence, no falls   NEURO: No headaches, No numbness/tingling b/l LE, No weakness  ENDOCRINE: No polyuria, polydipsia, heat or cold intolerance; No hair loss  PSYCHIATRIC: No depression, anxiety, +difficulty sleeping    PHYSICAL EXAM:  GENERAL:  Alert & Oriented X4, cooperative, NAD, Good concentration. Speech is clear. Mandarin speaking  RESPIRATORY: Respirations even and unlabored. Clear to auscultation bilaterally; No rales, rhonchi, wheezing, or rubs  CARDIOVASCULAR: Normal S1/S2, regular rate and rhythm; No murmurs, rubs, or gallops. No JVD.   GASTROINTESTINAL:  Soft, Nontender, Nondistended; Bowel sounds present  PERIPHERAL VASCULAR:  Extremities warm without edema. 2+ Peripheral Pulses, No cyanosis, No calf tenderness  MUSCULOSKELETAL: Motor Strength 5/5 B/L upper and lower extremities; moves all extremities equally against gravity; ROM decreased RLE; + tenderness on palpation of Right knee pain  SKIN: Warm, dry, intact. No rashes, lesions, + b/l wrist scar. Right knee with ace wrap dressing c/d/i    Risk factors associated with adverse outcomes related to opioid treatment  [ ]  Concurrent benzodiazepine use  [ ]  History/ Active substance use or alcohol use disorder  [ ] Psychiatric co-morbidity  [ ] Sleep apnea  [ ] COPD  [ ] BMI> 35  [ ] Liver dysfunction  [ ] Renal dysfunction  [ ] CHF  [ ] Smoker  [x]  Age > 60 years    [x]  [place]  Reviewed and Copied to Chart. See below.    Plan of care and goal oriented pain management treatment options were discussed with patient and /or primary care giver; all questions and concerns were addressed and care was aligned with patient's wishes.    Educated patient on goal oriented pain management treatment options

## 2023-06-09 NOTE — DISCHARGE NOTE NURSING/CASE MANAGEMENT/SOCIAL WORK - PATIENT PORTAL LINK FT
You can access the FollowMyHealth Patient Portal offered by [facility] by registering at the following website: [email or web address]. By joining [facility]’s FollowMyHealth portal, you will also be able to view your health information using other applications (apps) compatible with our system.

## 2023-06-09 NOTE — DISCHARGE NOTE PROVIDER - NSDCCPCAREPLAN_GEN_ALL_CORE_FT
PRINCIPAL DISCHARGE DIAGNOSIS  Diagnosis: Right knee DJD  Assessment and Plan of Treatment: Pain Management- See Attached Medication Reconciliation  **StretchStrap Stretching exercises for Total knee replacement***  Weight Bearing Status: WBAT to RLE with walker  Equipment needs: Commode, Walker  Dressing: Please keep bandage/dressing Clean, Dry, and Intact.  Incision Site: Absorbable sutures placed  PLEASE LEAVE AQUACELL DRESSING INTACT UNTIL THE PATIENT IS SEEN BY DR. SHEPHERD IN TWO WEEKS  Dvt prophylaxis: Continue with Aspirin 81mg home medication  PT/Occupational Therapy are Activities of Daily Living as appropriate  Follow up with Orthopedic Surgeon Dr. Shepherd in two weeks at [phone]      SECONDARY DISCHARGE DIAGNOSES  Diagnosis: HTN (hypertension)  Assessment and Plan of Treatment: Continue with home medications    Diagnosis: DM (diabetes mellitus)  Assessment and Plan of Treatment: Continue with home medications    Diagnosis: HLD (hyperlipidemia)  Assessment and Plan of Treatment: Continue with home medications

## 2023-06-09 NOTE — PATIENT PROFILE ADULT - FALL HARM RISK - HARM RISK INTERVENTIONS

## 2023-06-09 NOTE — ASU PATIENT PROFILE, ADULT - NSICDXPASTMEDICALHX_GEN_ALL_CORE_FT
PAST MEDICAL HISTORY:  Asthma     DM (diabetes mellitus)     HLD (hyperlipidemia)     HTN (hypertension)     OA (osteoarthritis)

## 2023-06-09 NOTE — DISCHARGE NOTE PROVIDER - NSDCMRMEDTOKEN_GEN_ALL_CORE_FT
alendronate 70 mg oral tablet: 1 orally  amLODIPine 5 mg oral tablet: 1 orally  aspirin 81 mg oral tablet: 1 orally  calcium (as carbonate) 500 mg oral tablet: orally  cyclobenzaprine 10 mg oral tablet: 1 tab(s) orally every 8 hours as needed for  muscle spasm MDD: 3  ibuprofen 600 mg oral tablet: 1 tab(s) orally every 8 hours as needed for  moderate pain MDD: 3  losartan 100 mg oral tablet: 1 orally  MetFORMIN (Eqv-Glumetza) 1000 mg oral tablet, extended release: 1 orally  omeprazole 40 mg oral delayed release capsule: 1 orally  oxyBUTYnin 5 mg oral tablet: 1 orally  traMADol 50 mg oral tablet: 1 tab(s) orally every 12 hours as needed for  severe pain MDD: 2  Vascepa 1 g oral capsule: 2 orally   alendronate 70 mg oral tablet: 1 orally  amLODIPine 5 mg oral tablet: 1 orally  aspirin 81 mg oral tablet, chewable: 1 tab(s) chewed once a day take 1 tab po qd  calcium (as carbonate) 500 mg oral tablet: orally  cyclobenzaprine 10 mg oral tablet: 1 tab(s) orally every 8 hours as needed for  muscle spasm MDD: 3  ibuprofen 600 mg oral tablet: 1 tab(s) orally every 8 hours as needed for  moderate pain MDD: 3  losartan 100 mg oral tablet: 1 orally  MetFORMIN (Eqv-Glumetza) 1000 mg oral tablet, extended release: 1 orally  omeprazole 40 mg oral delayed release capsule: 1 orally  oxyBUTYnin 5 mg oral tablet: 1 orally  traMADol 50 mg oral tablet: 1 tab(s) orally every 12 hours as needed for  severe pain MDD: 2  Vascepa 1 g oral capsule: 2 orally

## 2023-06-09 NOTE — DISCHARGE NOTE PROVIDER - HOSPITAL COURSE
Pt is a 68 y/o F who underwent elective R Total knee replacement on 6/9/23. No complications. Pt received daily Physical therapy and Deep vein thrombosis prophylaxis postoperatively. Stable for discharge home.

## 2023-06-09 NOTE — DISCHARGE NOTE PROVIDER - CARE PROVIDER_API CALL
Kwesi Arce  Orthopaedic Surgery  [address] [place] [address]  Phone: [phone]  Fax: [phone]  Follow Up Time: 2 weeks

## 2023-06-09 NOTE — ASU PATIENT PROFILE, ADULT - FALL HARM RISK - HARM RISK INTERVENTIONS

## 2023-06-09 NOTE — DISCHARGE NOTE PROVIDER - NSDCFUADDINST_GEN_ALL_CORE_FT
Keep Dressing clean and dry   Weight Bearing As Tolerated to Right lower extremity with appropriate assistive device  Patient is to follow up with Orthopedic Surgeon, Dr. Arce in office in TWO WEEKS at: [phone]

## 2023-06-09 NOTE — ASU PREOP CHECKLIST - PATIENT'S PERSONAL PROPERTY REMOVED
Patient states she needs a prescription for a new reader for the one that she wears and is read for 2 weeks. as stated by the pt.

## 2023-06-09 NOTE — BRIEF OPERATIVE NOTE - NSICDXBRIEFPROCEDURE_GEN_ALL_CORE_FT
PROCEDURES:  Right total knee replacement 09-Jun-2023 09:13:49 S/p Right total knee replacement Pierre Carrillo

## 2023-06-16 LAB — SURGICAL PATHOLOGY STUDY: SIGNIFICANT CHANGE UP

## 2024-04-11 NOTE — ASU PREOP CHECKLIST - VIA
Pain:  Patient is here today with complaints of right leg pain after a fall 12 days ago. Pt states she fell and hit her head on pavement. Pt did not go to ER to be evaluated. Pt states she did have a bump on her head and denies loc, and HA.  This is a/an recent problem.  This has been going on for 12 day(s).  Exacerbating factors include weight bearing.  Alleviating factors include sitting.  Pain is sharp in nature.  6/10.  Pain is  radiating up to the side of the thigh.  This has not happen to patient before.  Imaging to date includes none.Pt is not fasting    HM reviewed.     Patient's past medical, surgical, social and/or family history reviewed, updated in chart, and are non-contributory (unless otherwise stated).  Medications and allergies also reviewed and updated in chart.      Review of Systems:  Constitutional:  No fever, no fatigue, no chills, no headaches, no weight change  Dermatology:  No rash, no mole, no dry or sensitive skin  ENT:  No cough, no sore throat, no sinus pain, no runny nose, no ear pain  Cardiology:  No chest pain, no palpitations, no leg edema, no shortness of breath, no PND  Gastroenterology:  No dysphagia, no abdominal pain, no nausea, no vomiting, no constipation, no diarrhea, no heartburn  Musculoskeletal:  No joint pain, no leg cramps, no back pain, no muscle aches  Respiratory:  No shortness of breath, no orthopnea, no wheezing, no ALVARADO, no hemoptysis  Urology:  No blood in the urine, no urinary frequency, no urinary incontinence, no urinary urgency, no nocturia, no dysuria  Vitals:    04/11/24 1135 04/11/24 1146   BP: (!) 146/74 136/80   Pulse: 77    Resp: 16    Temp: 97.5 °F (36.4 °C)    TempSrc: Temporal    SpO2: 93%    Weight: 58.7 kg (129 lb 6.4 oz)    Height: 1.676 m (5' 6\")        General:  Patient alert and oriented x 3, NAD, pleasant  HEENT:  Atraumatic, normocephalic, PERRLA, EOMI, clear conjunctiva, TMs clear, nose-clear, throat - no erythema  Neck:  Supple, no 
wheelchair

## 2025-01-01 NOTE — ASU PREOP CHECKLIST - TYPE OF SOLUTION
Copied from CRM #[id number]. Topic: MW Medication/Rx - MW Rx Refill  >> Jan 1, 2025  2:00 PM Ricardo HERNANDEZ wrote:  Chen called to request a medication during after hrs.  Medication is    Listed on Med Management list. Selected 'Wrap Up CRM' and created new Refill Med Encounter after clicking 'Convert to Clinical Call'. Selected reason for call 'Refill Request'. Pended medication. Sent Med template and routed as routine priority per Care Site Dept KB page for Med Refill After Hrs support to appropriate clinical pool. Readback full message.    -- DO NOT REPLY / DO NOT REPLY ALL --  -- This inbox is not monitored. If this was sent to the wrong provider or department, reroute message to P ECO Reroute pool. --  -- Message is from Engagement Center Operations (ECO) --      Message Type:  Refill Medication   Refill request for Pended medication named: ARIPiprazole (ABILIFY) 5 MG tablet   Preferred pharmacy verified, and selected.   [id number] [place], [place] - [address] [place] [address] RD    Is the patient OUT of Medication?  No Medication Refills handled by Care Site - route as routine priority to care site pool on KB. Patient has been advised it will be addressed within 2-3 business days.     Message: N/A                   n/a

## (undated) DEVICE — Device

## (undated) DEVICE — MARKING PEN W RULER

## (undated) DEVICE — FOR-ESU VALLEYLAB T7E14842DX: Type: DURABLE MEDICAL EQUIPMENT

## (undated) DEVICE — ELCTR GROUNDING PAD ADULT COVIDIEN

## (undated) DEVICE — DRSG DERMABOND PRINEO 60CM

## (undated) DEVICE — TOURNIQUET ESMARK 6"

## (undated) DEVICE — TAPE SILK 3"

## (undated) DEVICE — SAW BLADE STRYKER SAGITTAL DUAL CUT 18MMX90MMX1.27MM

## (undated) DEVICE — DRAPE TOWEL BLUE 17" X 24"

## (undated) DEVICE — SUT POLYSORB 1 18" UNDYED

## (undated) DEVICE — FOLEY TRAY 16FR SURESTEP LTX BG

## (undated) DEVICE — VENODYNE/SCD SLEEVE CALF MEDIUM

## (undated) DEVICE — SUT QUILL MONODERM 3-0 30CM 26MM

## (undated) DEVICE — SYR LUER LOK 20CC

## (undated) DEVICE — GOWN XXL

## (undated) DEVICE — WARMING BLANKET UPPER ADULT

## (undated) DEVICE — DRAPE LIGHT HANDLE COVER (BLUE)

## (undated) DEVICE — SOL IRR BAG NS 0.9% 3000ML

## (undated) DEVICE — SOL INJ NS 0.9% 100ML

## (undated) DEVICE — ELCTR AQUAMANTYS BIPOLAR SEALER 6.0

## (undated) DEVICE — STRYKER MIXEVAC 3 BONE CEMENT MIXER

## (undated) DEVICE — TOURNIQUET CUFF 34" DUAL PORT W PLC

## (undated) DEVICE — SOL INJ NS 0.9% 500ML 1-PORT

## (undated) DEVICE — DRAPE IOBAN 33" X 23"

## (undated) DEVICE — SOL IRR POUR H2O 1500ML

## (undated) DEVICE — POSITIONER STIRRUP STRAP W SLIP RING 19X3.5"

## (undated) DEVICE — SUT POLYSORB 2-0 30" GS-10 UNDYED

## (undated) DEVICE — FOR-TOURNIQUET 27679911: Type: DURABLE MEDICAL EQUIPMENT

## (undated) DEVICE — DRSG AQUACEL 3.5 X 10"

## (undated) DEVICE — GLV 8.5 PROTEXIS (BLUE)

## (undated) DEVICE — HOOD FLYTE STRYKER HELMET SHIELD

## (undated) DEVICE — MARKING PEN W RULER / LABELS

## (undated) DEVICE — NDL HYPO SAFE 22G X 1.5" (BLACK)

## (undated) DEVICE — GLV 8 PROTEXIS (CREAM) MICRO